# Patient Record
Sex: MALE | Race: WHITE
[De-identification: names, ages, dates, MRNs, and addresses within clinical notes are randomized per-mention and may not be internally consistent; named-entity substitution may affect disease eponyms.]

---

## 2018-07-25 ENCOUNTER — HOSPITAL ENCOUNTER (EMERGENCY)
Dept: HOSPITAL 31 - C.ER | Age: 73
Discharge: HOME | End: 2018-07-25
Payer: SELF-PAY

## 2018-07-25 VITALS
OXYGEN SATURATION: 98 % | RESPIRATION RATE: 16 BRPM | HEART RATE: 58 BPM | TEMPERATURE: 98.1 F | SYSTOLIC BLOOD PRESSURE: 163 MMHG | DIASTOLIC BLOOD PRESSURE: 78 MMHG
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
## 2018-07-25 DIAGNOSIS — S52.692A: ICD-10-CM

## 2018-07-25 DIAGNOSIS — S52.502A: Primary | ICD-10-CM
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
## 2018-07-25 NOTE — RAD
Date of service: 



07/25/2018



PROCEDURE:  Left Wrist Radiographs.







HISTORY:

POST REDUCTION



COMPARISON:

Left wrist radiographs 07/25/2018 10:13 a.m..



FINDINGS:



BONES:

Cast obscures fine bony detail.  Colles fracture distal left radius 

appears adequately reduced which is best apparent in the lateral 

view. No interval new fracture identified. Navicular bone remains 

intact swells remaining carpal bones as imaged. Nondisplaced ulnar 

styloid fracture reiterated. 



JOINTS:

No subluxation or dislocation.



SOFT TISSUES:

Mild local soft tissue edema remains. 



OTHER FINDINGS:

None.



IMPRESSION:

Adequate postreduction of distal left radial fracture as discussed 

above. Stable nondisplaced ulnar styloid fracture. Cast obscures fine 

bony and soft-tissue detail.

## 2018-07-25 NOTE — RAD
Date of service: 



07/25/2018



PROCEDURE:  Left Wrist Radiographs.







HISTORY:

LEFT WRIST PAIN AFTER FALL R/O FX



COMPARISON:

None.



FINDINGS:



BONES:

Impacted fracture of the distal radial metaphysis without 

intra-articular extension.  Minimally displaced ulnar styloid 

fracture. 



JOINTS:

Diffuse joint space narrowing. 



SOFT TISSUES:

Circumferential wrist soft tissue swelling. 



OTHER FINDINGS:

None.



IMPRESSION:

Impacted fracture of the distal radial metaphysis without 

intra-articular extension.  Minimally displaced ulnar styloid 

fracture.

## 2018-07-25 NOTE — RAD
PROCEDURE:  Left Hand Radiographs.



HISTORY:

LEFT HAND PAIN AFTER FALL R/O FX  



COMPARISON:

None.



FINDINGS:



BONES:

Impacted fracture of the distal radial metaphysis without 

intra-articular extension.  Minimally displaced ulnar styloid 

fracture. 



JOINTS:

Diffuse joint space narrowing. 



SOFT TISSUES:

Circumferential wrist soft tissue swelling. 



OTHER FINDINGS:

None.



IMPRESSION:

Impacted fracture of the distal radial metaphysis without 

intra-articular extension.  Minimally displaced ulnar styloid 

fracture.

## 2018-07-25 NOTE — C.PDOC
History Of Present Illness


72 year old male presents to the ED for evaluation of left wrist pain after he 

sustained a fall earlier today. Patient states he slipped while going down the 

stairs, fell onto his left side, and landed onto his left wrist. He denies head 

injury, LOC, chest pain, shortness of breath, back pain, sensory changes. 


Time Seen by Provider: 07/25/18 09:47


Chief Complaint (Nursing): Upper Extremity Problem/Injury


History Per: Patient


History/Exam Limitations: no limitations


Onset/Duration Of Symptoms: Hrs


Current Symptoms Are (Timing): Still Present


Quality: "Pain"


Severity: Moderate


Additional History Per: Patient





Past Medical History


Reviewed: Historical Data, Nursing Documentation, Vital Signs


Vital Signs: 


 Last Vital Signs











Temp  98.1 F   07/25/18 09:42


 


Pulse  58 L  07/25/18 09:42


 


Resp  16   07/25/18 09:42


 


BP  163/78 H  07/25/18 09:42


 


Pulse Ox  98   07/25/18 23:39














- Medical History


PMH: No Chronic Diseases


Surgical History: No Surg Hx


Family History: States: No Known Family Hx





- Social History


Hx Alcohol Use: No


Hx Substance Use: No





- Immunization History


Hx Tetanus Toxoid Vaccination: No


Hx Influenza Vaccination: No


Hx Pneumococcal Vaccination: No





Review Of Systems


Constitutional: Negative for: Fever, Chills


Cardiovascular: Negative for: Chest Pain


Respiratory: Negative for: Shortness of Breath


Gastrointestinal: Negative for: Nausea, Vomiting, Abdominal Pain


Musculoskeletal: Positive for: Other (left wrist pain ).  Negative for: Back 

Pain


Skin: Negative for: Rash


Neurological: Negative for: Weakness, Numbness, Headache, Other (head injury, 

LOC )





Physical Exam





- Physical Exam


Appears: Well, Non-toxic, Other (in mild to moderate pain )


Skin: Normal Color, Warm, Dry


Head: Atraumatic, Normacephalic


Eye(s): bilateral: Normal Inspection, PERRL, EOMI


Oral Mucosa: Moist


Neck: Normal ROM, No Midline Cervical Tenderness, No Paracervical Tenderness, 

No Step Off Deformity, Supple


Chest: Symmetrical, No Deformity, No Tenderness


Cardiovascular: Rhythm Regular


Respiratory: Normal Breath Sounds, No Rales, No Rhonchi, No Wheezing


Gastrointestinal/Abdominal: Normal Exam, Bowel Sounds, Soft, No Guarding, No 

Rebound


Extremity: Normal ROM (digits of left hand ), Tenderness (left wrist diffuse TTP

), Capillary Refill (< 2 sec all digits ), Deformity (left distal radius/ulna), 

Swelling (left wrist ), No Other (elbow or shoulder tenderness )


Pulses: Left Radial: Normal, Right Radial: Normal


Neurological/Psych: Oriented x3


Gait: Steady





ED Course And Treatment


O2 Sat by Pulse Oximetry: 98 (RA)


Pulse Ox Interpretation: Normal





- Other Rad


  ** Initial left wrist XR


X-Ray: Interpreted by Me, Viewed By Me, Read By Radiologist


Interpretation: PROCEDURE:  Left Wrist Radiographs.  .  HISTORY:  LEFT WRIST 

PAIN AFTER FALL R/O FX.  COMPARISON:  None.  FINDINGS:  BONES:  Impacted 

fracture of the distal radial metaphysis without intra-articular extension.  

Minimally displaced ulnar styloid fracture.  JOINTS:  Diffuse joint space 

narrowing.  SOFT TISSUES:  Circumferential wrist soft tissue swelling.  OTHER 

FINDINGS:  None.  IMPRESSION:  Impacted fracture of the distal radial 

metaphysis without intra-articular extension.  Minimally displaced ulnar 

styloid fracture.





  ** left elbow XR


X-Ray: Interpreted by Me, Viewed By Me, Read By Radiologist


Interpretation: PROCEDURE:  Radiographs of the left elbow.  HISTORY:  S/P FALL R

/O FX.  COMPARISON:  No prior.  FINDINGS:  BONES:  No acute fracture.  JOINTS:  

Unremarkable.  SOFT TISSUES:  Normal.  JOINT EFFUSION:  None.  OTHER FINDINGS:  

None.  IMPRESSION:  No demonstrated fracture or dislocation.





  ** left hand XR


X-Ray: Interpreted by Me, Viewed By Me, Read By Radiologist


Interpretation: PROCEDURE:  Left Hand Radiographs.  HISTORY:  LEFT HAND PAIN 

AFTER FALL R/O FX.  COMPARISON:  None.  FINDINGS:  BONES:  Impacted fracture of 

the distal radial metaphysis without intra-articular extension.  Minimally 

displaced ulnar styloid fracture.  JOINTS:  Diffuse joint space narrowing.  

SOFT TISSUES:  Circumferential wrist soft tissue swelling.  OTHER FINDINGS:  

None.  IMPRESSION:  Impacted fracture of the distal radial metaphysis without 

intra-articular extension.  Minimally displaced ulnar styloid fracture.





  ** post-reduction left wrist XR


X-Ray: Interpreted by Me, Viewed By Me, Read By Radiologist


Interpretation: 07/25/2018.  PROCEDURE:  Left Wrist Radiographs.  .  HISTORY:  

POST REDUCTION.  COMPARISON:  Left wrist radiographs 07/25/2018 10:13 a.m..  

FINDINGS:  BONES:  Cast obscures fine bony detail.  Colles fracture distal left 

radius appears adequately reduced which is best apparent in the lateral view. 

No interval new fracture identified. Navicular bone remains intact swells 

remaining carpal bones as imaged. Nondisplaced ulnar styloid fracture 

reiterated.  JOINTS:  No subluxation or dislocation.  SOFT TISSUES:  Mild local 

soft tissue edema remains.  OTHER FINDINGS:  None.  IMPRESSION:  Adequate 

postreduction of distal left radial fracture as discussed above. Stable 

nondisplaced ulnar styloid fracture. Cast obscures fine bony and soft-tissue 

detail.


Progress Note: Xrays of left wrist, elbow and hand ordered and reviewed.  Xrays 

show fxs of distal radius/ulna.  Reductinon done by me, patient tolerated well.

  Hematoma block done with  2% lidocaine, 5 ml local infiltration. Finger trap 

and weights used. Patient tolerated well. Volar splint was applied by me. Post-

reduction XR shows adequate postreduction of fracture.  Patient is resting 

comfortably and is stable for discharge. Patient given Rx for Acetaminophen 

with Codeine. He is advised to follow up with orthopedic care/hand surgery for 

further evaluation within 1 week.





- Physician Consult Information


Physician Contacted: Mary Ellen Gonzales


Outcome Of Conversation: Discussed patient with Ortho PA, recommends patient 

follow up with Dr. Carlos's office, if unable to go there should call our 

clinic for appt on Aug 15, 2018.





Orthopedic


Time Out: Side verified, Site verified


Procedure: Fracture reduction


Location: Left, Wrist


Performed by: Attending Physician


Diagnosis: Fracture


Type: Closed


Other:: 





fracture of distal radius and ulna


Anesthetic: Lidocaine 2% (5ml, locally )


Capillary refill: Normal


Capillary Refill: Normal


Compartment: Normal


Distal Sensation: Normal


Distal Motor Function: Normal


Post-reduction Radiograph: Reduced


Patient tolerated procedure: Well





Disposition


Counseled Patient/Family Regarding: Diagnosis, Need For Followup, Rx Given





- Disposition


Referrals: 


Chaz Carlos III, MD [Staff Provider] - 


[facility] [Outside]


Disposition: HOME/ ROUTINE


Disposition Time: 11:55


Condition: STABLE


Additional Instructions: 


CALL FOR APPOINTMENT AT DR CARLOS'S OFFICE





OR MAKE APPOINTMENT WITHOUT ORTHOPEDICS CLINIC AUGUST 15, 2018





RETURN TO ER IF SYMPTOMS WORSEN


Prescriptions: 


Acetaminophen with Codeine [Tylenol with Codeine #3 Tablet] 1 each PO Q6 PRN #

15 tablet


 PRN Reason: pain 


Instructions:  Wrist Fracture (DC), Radius Fracture (DC)


Forms:  [employer] (English)


Print Language: ENGLISH





- POA


Present On Arrival: Falls Or Trauma





- Clinical Impression


Clinical Impression: 


 Distal radius fracture, Distal end of ulna fracture, closed








- Scribe Statement


The provider has reviewed the documentation as recorded by the Scribe (Rupinder Wood)


Provider Attestation: 








All medical record entries made by the Scribe were at my direction and 

personally dictated by me. I have reviewed the chart and agree that the record 

accurately reflects my personal performance of the history, physical exam, 

medical decision making, and the department course for this patient. I have 

also personally directed, reviewed, and agree with the discharge instructions 

and disposition.

## 2018-07-25 NOTE — RAD
Date of service: 



07/25/2018



PROCEDURE:  Radiographs of the left elbow.



HISTORY:

S/P FALL R/O FX  



COMPARISON:

No prior.



FINDINGS:



BONES:

No acute fracture.



JOINTS:

Unremarkable.



SOFT TISSUES:

Normal.



JOINT EFFUSION:

None.



OTHER FINDINGS:

None



IMPRESSION:

No demonstrated fracture or dislocation.